# Patient Record
(demographics unavailable — no encounter records)

---

## 2024-10-07 NOTE — PLAN
[With new medications prescribed] : Treat in place: with new medications prescribed [FreeTextEntry1] : -rx medrol dose pack -UseTylenol or Motrin, lidocaine patch, flexeril as discussed for pain management -f/u with PMD -Red flag symptoms for immediate ER evaluation discussed. pt expresses full understanding and agrees.

## 2024-10-07 NOTE — ASSESSMENT
[FreeTextEntry1] : left sided neck pain with decreased range of motion and muscles spasm, denies sensorimotor deficits, injuries/trauma.

## 2024-10-07 NOTE — HISTORY OF PRESENT ILLNESS
[Home] : at home, [unfilled] , at the time of the visit. [Other Location: e.g. Home (Enter Location, City,State)___] : at [unfilled] [Verbal consent obtained from patient] : the patient, [unfilled] [FreeTextEntry8] : 56 yo female with PMH cervical spondylosis for evaluation of left sided neck pain since yesterday morning. Pain is radiating into left shoulder. Reports has had similar pain in the past when had a pinched nerve (diagnosed on MRI) and it was improved at that time with medrol dose pack. Reports she is unable to turn her neck, decreased ROM 2/2 pain. Reports she has been taking Advil but reports that between going to work and driving today, the neck feels even more painful now, requesting medrol dose pack. Denies any injuries or trauma. Denies fevers or chills.  Denies injuries, trauma, sensorimotor deficits. States she has lidocaine patch and flexeril at home.  Allergy: penicillin no PMH

## 2025-04-02 NOTE — HEALTH RISK ASSESSMENT
[Good] : ~his/her~  mood as  good [No] : In the past 12 months have you used drugs other than those required for medical reasons? No [No falls in past year] : Patient reported no falls in the past year [0] : 2) Feeling down, depressed, or hopeless: Not at all (0) [PHQ-2 Negative - No further assessment needed] : PHQ-2 Negative - No further assessment needed [Never] : Never [Patient reported mammogram was normal] : Patient reported mammogram was normal [Patient reported PAP Smear was normal] : Patient reported PAP Smear was normal [With Significant Other] : lives with significant other [Employed] : employed [] :  [Reports changes in hearing] : Reports changes in hearing [de-identified] : Just restarted - walking routine , resistance training etc  [[id number]] : 0 [Reports changes in vision] : Reports no changes in vision [Reports changes in dental health] : Reports no changes in dental health [MammogramDate] : 3/2025 [PapSmearDate] : 3/2025  [de-identified] : right side getting hearing aids

## 2025-04-02 NOTE — PHYSICAL EXAM
[No Acute Distress] : no acute distress [Well-Appearing] : well-appearing [Normal Sclera/Conjunctiva] : normal sclera/conjunctiva [PERRL] : pupils equal round and reactive to light [EOMI] : extraocular movements intact [Normal Outer Ear/Nose] : the outer ears and nose were normal in appearance [Normal Oropharynx] : the oropharynx was normal [No JVD] : no jugular venous distention [No Lymphadenopathy] : no lymphadenopathy [Supple] : supple [Thyroid Normal, No Nodules] : the thyroid was normal and there were no nodules present [No Respiratory Distress] : no respiratory distress  [No Accessory Muscle Use] : no accessory muscle use [Clear to Auscultation] : lungs were clear to auscultation bilaterally [Normal Rate] : normal rate  [Regular Rhythm] : with a regular rhythm [Normal S1, S2] : normal S1 and S2 [No Murmur] : no murmur heard [Pedal Pulses Present] : the pedal pulses are present [No Edema] : there was no peripheral edema [No Extremity Clubbing/Cyanosis] : no extremity clubbing/cyanosis [Soft] : abdomen soft [Non Tender] : non-tender [Non-distended] : non-distended [No Masses] : no abdominal mass palpated [No HSM] : no HSM [Normal Bowel Sounds] : normal bowel sounds [Normal Posterior Cervical Nodes] : no posterior cervical lymphadenopathy [Normal Anterior Cervical Nodes] : no anterior cervical lymphadenopathy [No CVA Tenderness] : no CVA  tenderness [No Spinal Tenderness] : no spinal tenderness [No Joint Swelling] : no joint swelling [Grossly Normal Strength/Tone] : grossly normal strength/tone [No Rash] : no rash [Coordination Grossly Intact] : coordination grossly intact [No Focal Deficits] : no focal deficits [Normal Gait] : normal gait [Normal Affect] : the affect was normal [Deep Tendon Reflexes (DTR)] : deep tendon reflexes were 2+ and symmetric [Normal Insight/Judgement] : insight and judgment were intact

## 2025-04-02 NOTE — ASSESSMENT
[FreeTextEntry1] : GERD - good ,occ flares up uses protonix as needed rx send  -Educated patient lifestyle modification, advice to avoid fried food, greasy oily and spicy foods, avoid tomato, orange, lemon , or caffeinated beverages. -Avoid reclining upto 3 hours after meals  Hemorrhoids -better  -saw Dr Adin kent saw 5/2022 did MR pelvis 6/2022  showed no GI finding -no constipation  colonoscope was 3/2021  - trial of Hydrocortisone cream qhs 1 week   non specific pain lower abd - hot flashes - get FSH , LH and estrdiol  still has periods -taking Women for life 50 and above supplements helps for hot flashes  hx fibroids- gyn f/u  h/o Abnormal Pap smear/Enlarged uterus -fibroids - saw GYN 3/2025 did PAP and pelvic US  - Followed by gynecologist Cl Valdez [phone] , did vaginal ultrasound-fibroids as per pt - MRI pelvis reviewed 6/29/22 - 7.3 cm intramural fibroids  History of chronic lower back pain--stable at present, advise to avoid bleeding, pushing, carrying or lifting heavy weights, take nonsteroidal anti-inflammatory medication as needed  Health maintenance Tetanus vaccine- DTaP given 2015-pt deferred  colonoscope -3 /2021 due 10 yrs Mammogram-3/2025  - at [facility] , Records requested. did at [facility] [address] , understands risk of breast cancer, Pap smear 3/2025  nl flu vaccine -9/2020 - pt deferred Shingrix advised-pt deferred  Covid vaccine - never- refused. dermatologist 2024 as per pt neg

## 2025-04-02 NOTE — HISTORY OF PRESENT ILLNESS
[de-identified] : 57 y.o. female, PMHx hyperlipidemia, hearing loss, low back pain, cervical radiculopathy, hiatal hernia with GERD, spontaneous pneumothorax x 2. seen for her annual physical   did Mammo 3/2025 and pelvic UA and Pap 3/2025 -her GYN wants to get hormonal panel added to todays BW - still has periods   Hearing loss rt ear - afster a trip to [place] - her ear popped up saw MD did MRI - did hearing test will be getting hearing aids